# Patient Record
Sex: FEMALE | Race: OTHER | URBAN - METROPOLITAN AREA
[De-identification: names, ages, dates, MRNs, and addresses within clinical notes are randomized per-mention and may not be internally consistent; named-entity substitution may affect disease eponyms.]

---

## 2017-01-25 PROBLEM — Z00.00 ENCOUNTER FOR PREVENTIVE HEALTH EXAMINATION: Status: ACTIVE | Noted: 2017-01-25

## 2017-08-17 ENCOUNTER — OUTPATIENT (OUTPATIENT)
Dept: OUTPATIENT SERVICES | Facility: HOSPITAL | Age: 47
LOS: 1 days | End: 2017-08-17
Payer: COMMERCIAL

## 2017-08-17 VITALS
DIASTOLIC BLOOD PRESSURE: 80 MMHG | WEIGHT: 161.82 LBS | HEART RATE: 76 BPM | RESPIRATION RATE: 20 BRPM | HEIGHT: 62 IN | TEMPERATURE: 98 F | SYSTOLIC BLOOD PRESSURE: 120 MMHG

## 2017-08-17 DIAGNOSIS — N87.0 MILD CERVICAL DYSPLASIA: ICD-10-CM

## 2017-08-17 DIAGNOSIS — Z01.818 ENCOUNTER FOR OTHER PREPROCEDURAL EXAMINATION: ICD-10-CM

## 2017-08-17 DIAGNOSIS — Z98.891 HISTORY OF UTERINE SCAR FROM PREVIOUS SURGERY: Chronic | ICD-10-CM

## 2017-08-17 LAB
ANION GAP SERPL CALC-SCNC: 15 MMOL/L — SIGNIFICANT CHANGE UP (ref 5–17)
BASOPHILS # BLD AUTO: 0 K/UL — SIGNIFICANT CHANGE UP (ref 0–0.2)
BASOPHILS NFR BLD AUTO: 0.4 % — SIGNIFICANT CHANGE UP (ref 0–2)
BUN SERPL-MCNC: 12 MG/DL — SIGNIFICANT CHANGE UP (ref 8–20)
CALCIUM SERPL-MCNC: 9.5 MG/DL — SIGNIFICANT CHANGE UP (ref 8.6–10.2)
CHLORIDE SERPL-SCNC: 104 MMOL/L — SIGNIFICANT CHANGE UP (ref 98–107)
CO2 SERPL-SCNC: 24 MMOL/L — SIGNIFICANT CHANGE UP (ref 22–29)
CREAT SERPL-MCNC: 0.61 MG/DL — SIGNIFICANT CHANGE UP (ref 0.5–1.3)
EOSINOPHIL # BLD AUTO: 0 K/UL — SIGNIFICANT CHANGE UP (ref 0–0.5)
EOSINOPHIL NFR BLD AUTO: 1.1 % — SIGNIFICANT CHANGE UP (ref 0–6)
GLUCOSE SERPL-MCNC: 103 MG/DL — SIGNIFICANT CHANGE UP (ref 70–115)
HCT VFR BLD CALC: 45 % — SIGNIFICANT CHANGE UP (ref 37–47)
HGB BLD-MCNC: 14.8 G/DL — SIGNIFICANT CHANGE UP (ref 12–16)
LYMPHOCYTES # BLD AUTO: 1.6 K/UL — SIGNIFICANT CHANGE UP (ref 1–4.8)
LYMPHOCYTES # BLD AUTO: 35.5 % — SIGNIFICANT CHANGE UP (ref 20–55)
MCHC RBC-ENTMCNC: 25.6 PG — LOW (ref 27–31)
MCHC RBC-ENTMCNC: 32.9 G/DL — SIGNIFICANT CHANGE UP (ref 32–36)
MCV RBC AUTO: 77.7 FL — LOW (ref 81–99)
MONOCYTES # BLD AUTO: 0.4 K/UL — SIGNIFICANT CHANGE UP (ref 0–0.8)
MONOCYTES NFR BLD AUTO: 8 % — SIGNIFICANT CHANGE UP (ref 3–10)
NEUTROPHILS # BLD AUTO: 2.5 K/UL — SIGNIFICANT CHANGE UP (ref 1.8–8)
NEUTROPHILS NFR BLD AUTO: 54.6 % — SIGNIFICANT CHANGE UP (ref 37–73)
PLATELET # BLD AUTO: 244 K/UL — SIGNIFICANT CHANGE UP (ref 150–400)
POTASSIUM SERPL-MCNC: 4.2 MMOL/L — SIGNIFICANT CHANGE UP (ref 3.5–5.3)
POTASSIUM SERPL-SCNC: 4.2 MMOL/L — SIGNIFICANT CHANGE UP (ref 3.5–5.3)
RBC # BLD: 5.79 M/UL — HIGH (ref 4.4–5.2)
RBC # FLD: 14.9 % — SIGNIFICANT CHANGE UP (ref 11–15.6)
SODIUM SERPL-SCNC: 143 MMOL/L — SIGNIFICANT CHANGE UP (ref 135–145)
WBC # BLD: 4.6 K/UL — LOW (ref 4.8–10.8)
WBC # FLD AUTO: 4.6 K/UL — LOW (ref 4.8–10.8)

## 2017-08-17 PROCEDURE — 85027 COMPLETE CBC AUTOMATED: CPT

## 2017-08-17 PROCEDURE — 80048 BASIC METABOLIC PNL TOTAL CA: CPT

## 2017-08-17 PROCEDURE — 36415 COLL VENOUS BLD VENIPUNCTURE: CPT

## 2017-08-17 PROCEDURE — G0463: CPT

## 2017-08-17 NOTE — ASU PATIENT PROFILE, ADULT - LEARNING ASSESSMENT (PATIENT) ADDITIONAL COMMENTS
Instructed pt verbally in Guyanese via telephone  and in writing Gu[place]ese on pre-op instructions, tips for safer surgery, pain management scale and verbalized understanding of all.

## 2017-08-17 NOTE — H&P PST ADULT - HISTORY OF PRESENT ILLNESS
47F  with abnormal pap smear with colposcopy in office showing abnormal cells, now scheduled for LEEP.

## 2017-08-17 NOTE — H&P PST ADULT - NSANTHOSAYNRD_GEN_A_CORE
No. RUTH screening performed.  STOP BANG Legend: 0-2 = LOW Risk; 3-4 = INTERMEDIATE Risk; 5-8 = HIGH Risk

## 2017-08-17 NOTE — H&P PST ADULT - FAMILY HISTORY
Mother  Still living? Yes, Estimated age: Age Unknown  Family history of diabetes mellitus, Age at diagnosis: Age Unknown     Sibling  Still living? Yes, Estimated age: Age Unknown  Family history of diabetes mellitus, Age at diagnosis: Age Unknown

## 2017-08-17 NOTE — H&P PST ADULT - ATTENDING COMMENTS
48yo female admitted at this time for evaluation of her abnormal pap smear-pt underwent colpo and cervical biopsy however the cervix was stenotic and the colpo unsatisfactory and the ecc+ Pt now admitted for leep of the cervix at this time. Risks and benefits were reviewed at length some of which include bleeding ,infection, anesthesia as well as the risk of premature labor and delivery-pt understands and accepts these risks.

## 2017-08-23 ENCOUNTER — OUTPATIENT (OUTPATIENT)
Dept: OUTPATIENT SERVICES | Facility: HOSPITAL | Age: 47
LOS: 1 days | End: 2017-08-23
Payer: COMMERCIAL

## 2017-08-23 ENCOUNTER — RESULT REVIEW (OUTPATIENT)
Age: 47
End: 2017-08-23

## 2017-08-23 VITALS
OXYGEN SATURATION: 99 % | RESPIRATION RATE: 16 BRPM | SYSTOLIC BLOOD PRESSURE: 112 MMHG | HEART RATE: 70 BPM | DIASTOLIC BLOOD PRESSURE: 60 MMHG

## 2017-08-23 VITALS
HEART RATE: 77 BPM | TEMPERATURE: 98 F | SYSTOLIC BLOOD PRESSURE: 115 MMHG | HEIGHT: 62 IN | RESPIRATION RATE: 16 BRPM | OXYGEN SATURATION: 99 % | DIASTOLIC BLOOD PRESSURE: 74 MMHG | WEIGHT: 161.82 LBS

## 2017-08-23 DIAGNOSIS — Z01.818 ENCOUNTER FOR OTHER PREPROCEDURAL EXAMINATION: ICD-10-CM

## 2017-08-23 DIAGNOSIS — Z98.891 HISTORY OF UTERINE SCAR FROM PREVIOUS SURGERY: Chronic | ICD-10-CM

## 2017-08-23 DIAGNOSIS — N87.0 MILD CERVICAL DYSPLASIA: ICD-10-CM

## 2017-08-23 PROCEDURE — 88305 TISSUE EXAM BY PATHOLOGIST: CPT | Mod: 26

## 2017-08-23 PROCEDURE — 57522 CONIZATION OF CERVIX: CPT

## 2017-08-23 PROCEDURE — 88305 TISSUE EXAM BY PATHOLOGIST: CPT

## 2017-08-23 PROCEDURE — 88307 TISSUE EXAM BY PATHOLOGIST: CPT | Mod: 26

## 2017-08-23 PROCEDURE — 88307 TISSUE EXAM BY PATHOLOGIST: CPT

## 2017-08-23 PROCEDURE — T1013: CPT

## 2017-08-23 RX ORDER — IBUPROFEN 200 MG
1 TABLET ORAL
Qty: 21 | Refills: 1 | OUTPATIENT
Start: 2017-08-23 | End: 2017-09-05

## 2017-08-23 RX ORDER — SODIUM CHLORIDE 9 MG/ML
1000 INJECTION, SOLUTION INTRAVENOUS
Qty: 0 | Refills: 0 | Status: DISCONTINUED | OUTPATIENT
Start: 2017-08-23 | End: 2017-08-23

## 2017-08-23 RX ORDER — HYDROMORPHONE HYDROCHLORIDE 2 MG/ML
0.5 INJECTION INTRAMUSCULAR; INTRAVENOUS; SUBCUTANEOUS
Qty: 0 | Refills: 0 | Status: DISCONTINUED | OUTPATIENT
Start: 2017-08-23 | End: 2017-08-23

## 2017-08-23 RX ORDER — ONDANSETRON 8 MG/1
4 TABLET, FILM COATED ORAL ONCE
Qty: 0 | Refills: 0 | Status: DISCONTINUED | OUTPATIENT
Start: 2017-08-23 | End: 2017-08-23

## 2017-08-24 ENCOUNTER — TRANSCRIPTION ENCOUNTER (OUTPATIENT)
Age: 47
End: 2017-08-24

## 2017-08-25 LAB — SURGICAL PATHOLOGY FINAL REPORT - CH: SIGNIFICANT CHANGE UP

## 2018-06-26 ENCOUNTER — OUTPATIENT (OUTPATIENT)
Dept: OUTPATIENT SERVICES | Facility: HOSPITAL | Age: 48
LOS: 1 days | End: 2018-06-26
Payer: COMMERCIAL

## 2018-06-26 VITALS
HEART RATE: 78 BPM | TEMPERATURE: 98 F | SYSTOLIC BLOOD PRESSURE: 119 MMHG | WEIGHT: 168.65 LBS | HEIGHT: 61 IN | RESPIRATION RATE: 16 BRPM | DIASTOLIC BLOOD PRESSURE: 78 MMHG

## 2018-06-26 DIAGNOSIS — N60.09 SOLITARY CYST OF UNSPECIFIED BREAST: Chronic | ICD-10-CM

## 2018-06-26 DIAGNOSIS — N87.0 MILD CERVICAL DYSPLASIA: ICD-10-CM

## 2018-06-26 DIAGNOSIS — Z01.818 ENCOUNTER FOR OTHER PREPROCEDURAL EXAMINATION: ICD-10-CM

## 2018-06-26 DIAGNOSIS — Z98.890 OTHER SPECIFIED POSTPROCEDURAL STATES: Chronic | ICD-10-CM

## 2018-06-26 DIAGNOSIS — Z98.891 HISTORY OF UTERINE SCAR FROM PREVIOUS SURGERY: Chronic | ICD-10-CM

## 2018-06-26 DIAGNOSIS — Z29.9 ENCOUNTER FOR PROPHYLACTIC MEASURES, UNSPECIFIED: ICD-10-CM

## 2018-06-26 LAB
ALBUMIN SERPL ELPH-MCNC: 4.7 G/DL — SIGNIFICANT CHANGE UP (ref 3.3–5.2)
ALP SERPL-CCNC: 108 U/L — SIGNIFICANT CHANGE UP (ref 40–120)
ALT FLD-CCNC: 29 U/L — SIGNIFICANT CHANGE UP
ANION GAP SERPL CALC-SCNC: 15 MMOL/L — SIGNIFICANT CHANGE UP (ref 5–17)
APTT BLD: 30.5 SEC — SIGNIFICANT CHANGE UP (ref 27.5–37.4)
AST SERPL-CCNC: 22 U/L — SIGNIFICANT CHANGE UP
BILIRUB SERPL-MCNC: <0.2 MG/DL — LOW (ref 0.4–2)
BLD GP AB SCN SERPL QL: SIGNIFICANT CHANGE UP
BUN SERPL-MCNC: 12 MG/DL — SIGNIFICANT CHANGE UP (ref 8–20)
CALCIUM SERPL-MCNC: 9.7 MG/DL — SIGNIFICANT CHANGE UP (ref 8.6–10.2)
CHLORIDE SERPL-SCNC: 101 MMOL/L — SIGNIFICANT CHANGE UP (ref 98–107)
CO2 SERPL-SCNC: 25 MMOL/L — SIGNIFICANT CHANGE UP (ref 22–29)
CREAT SERPL-MCNC: 0.59 MG/DL — SIGNIFICANT CHANGE UP (ref 0.5–1.3)
GLUCOSE SERPL-MCNC: 107 MG/DL — SIGNIFICANT CHANGE UP (ref 70–115)
HCT VFR BLD CALC: 44.6 % — SIGNIFICANT CHANGE UP (ref 37–47)
HGB BLD-MCNC: 14.3 G/DL — SIGNIFICANT CHANGE UP (ref 12–16)
INR BLD: 0.99 RATIO — SIGNIFICANT CHANGE UP (ref 0.88–1.16)
MCHC RBC-ENTMCNC: 25.1 PG — LOW (ref 27–31)
MCHC RBC-ENTMCNC: 32.1 G/DL — SIGNIFICANT CHANGE UP (ref 32–36)
MCV RBC AUTO: 78.4 FL — LOW (ref 81–99)
PLATELET # BLD AUTO: 240 K/UL — SIGNIFICANT CHANGE UP (ref 150–400)
POTASSIUM SERPL-MCNC: 3.9 MMOL/L — SIGNIFICANT CHANGE UP (ref 3.5–5.3)
POTASSIUM SERPL-SCNC: 3.9 MMOL/L — SIGNIFICANT CHANGE UP (ref 3.5–5.3)
PROT SERPL-MCNC: 7.9 G/DL — SIGNIFICANT CHANGE UP (ref 6.6–8.7)
PROTHROM AB SERPL-ACNC: 10.9 SEC — SIGNIFICANT CHANGE UP (ref 9.8–12.7)
RBC # BLD: 5.69 M/UL — HIGH (ref 4.4–5.2)
RBC # FLD: 14.9 % — SIGNIFICANT CHANGE UP (ref 11–15.6)
SODIUM SERPL-SCNC: 141 MMOL/L — SIGNIFICANT CHANGE UP (ref 135–145)
TYPE + AB SCN PNL BLD: SIGNIFICANT CHANGE UP
WBC # BLD: 5.8 K/UL — SIGNIFICANT CHANGE UP (ref 4.8–10.8)
WBC # FLD AUTO: 5.8 K/UL — SIGNIFICANT CHANGE UP (ref 4.8–10.8)

## 2018-06-26 PROCEDURE — 93010 ELECTROCARDIOGRAM REPORT: CPT

## 2018-06-26 PROCEDURE — G0463: CPT

## 2018-06-26 PROCEDURE — 93005 ELECTROCARDIOGRAM TRACING: CPT

## 2018-06-26 PROCEDURE — 86850 RBC ANTIBODY SCREEN: CPT

## 2018-06-26 PROCEDURE — T1013: CPT

## 2018-06-26 PROCEDURE — 86901 BLOOD TYPING SEROLOGIC RH(D): CPT

## 2018-06-26 PROCEDURE — 80053 COMPREHEN METABOLIC PANEL: CPT

## 2018-06-26 PROCEDURE — 85610 PROTHROMBIN TIME: CPT

## 2018-06-26 PROCEDURE — 85730 THROMBOPLASTIN TIME PARTIAL: CPT

## 2018-06-26 PROCEDURE — 86900 BLOOD TYPING SEROLOGIC ABO: CPT

## 2018-06-26 PROCEDURE — 85027 COMPLETE CBC AUTOMATED: CPT

## 2018-06-26 PROCEDURE — 36415 COLL VENOUS BLD VENIPUNCTURE: CPT

## 2018-06-26 RX ORDER — SODIUM CHLORIDE 9 MG/ML
3 INJECTION INTRAMUSCULAR; INTRAVENOUS; SUBCUTANEOUS ONCE
Qty: 0 | Refills: 0 | Status: DISCONTINUED | OUTPATIENT
Start: 2018-07-10 | End: 2018-07-10

## 2018-06-26 NOTE — H&P PST ADULT - FAMILY HISTORY
Mother  Still living? Yes, Estimated age: 71-80  Family history of diabetes mellitus, Age at diagnosis: Age Unknown  Family history of hypertension in mother, Age at diagnosis: Age Unknown     Sibling  Still living? Yes, Estimated age: Age Unknown  Family history of diabetes mellitus, Age at diagnosis: Age Unknown     Father  Still living? No  Family history of COPD (chronic obstructive pulmonary disease), Age at diagnosis: Age Unknown

## 2018-06-26 NOTE — H&P PST ADULT - NEGATIVE GASTROINTESTINAL SYMPTOMS
no jaundice/no steatorrhea/no abdominal pain/no hematochezia/no hiccoughs/no diarrhea/no change in bowel habits/no melena/no flatulence/no vomiting/no constipation/no nausea

## 2018-06-26 NOTE — H&P PST ADULT - GASTROINTESTINAL DETAILS
no rebound tenderness/no distention/no guarding/nontender/normal/no organomegaly/soft/no masses palpable/bowel sounds normal/no rigidity/no bruit

## 2018-06-26 NOTE — H&P PST ADULT - NEGATIVE GENERAL GENITOURINARY SYMPTOMS
no renal colic/no gas in urine/no dysuria/no incontinence/no flank pain L/no flank pain R/no nocturia/no urine discoloration/normal libido/no urinary hesitancy/normal urinary frequency/no bladder infections/no hematuria

## 2018-06-26 NOTE — H&P PST ADULT - NEGATIVE ENMT SYMPTOMS
no sinus symptoms/no gum bleeding/no vertigo/no hearing difficulty/no ear pain/no throat pain/no dysphagia/no nasal congestion/no recurrent cold sores/no nasal obstruction/no nasal discharge/no post-nasal discharge/no nose bleeds/no dry mouth/no tinnitus/no abnormal taste sensation

## 2018-06-26 NOTE — H&P PST ADULT - NEGATIVE NEUROLOGICAL SYMPTOMS
no transient paralysis/no syncope/no vertigo/no generalized seizures/no headache/no focal seizures/no loss of sensation/no loss of consciousness/no paresthesias/no hemiparesis/no facial palsy/no weakness/no confusion/no difficulty walking/no tremors

## 2018-06-26 NOTE — H&P PST ADULT - NEGATIVE OPHTHALMOLOGIC SYMPTOMS
no discharge L/no irritation L/no pain R/no loss of vision L/no scleral injection L/no loss of vision R/no lacrimation L/no lacrimation R/no blurred vision L/no irritation R/no diplopia/no photophobia/no blurred vision R/no discharge R/no pain L

## 2018-06-26 NOTE — H&P PST ADULT - ASSESSMENT
cervical dysplasia  CAPRINI SCORE    AGE RELATED RISK FACTORS                                                       MOBILITY RELATED FACTORS  [ x] Age 41-60 years                                            (1 Point)                  [ ] Bed rest                                                        (1 Point)  [ ] Age: 61-74 years                                           (2 Points)                [ ] Plaster cast                                                   (2 Points)  [ ] Age= 75 years                                              (3 Points)                 [ ] Bed bound for more than 72 hours                   (2 Points)    DISEASE RELATED RISK FACTORS                                               GENDER SPECIFIC FACTORS  [ ] Edema in the lower extremities                       (1 Point)                  [ ] Pregnancy                                                     (1 Point)  [ ] Varicose veins                                               (1 Point)                  [ ] Post-partum < 6 weeks                                   (1 Point)             [x ] BMI > 25 Kg/m2                                            (1 Point)                  [ ] Hormonal therapy  or oral contraception            (1 Point)                 [ ] Sepsis (in the previous month)                        (1 Point)                  [ ] History of pregnancy complications  [ ] Pneumonia or serious lung disease                                               [ ] Unexplained or recurrent                       (1 Point)           (in the previous month)                               (1 Point)  [ ] Abnormal pulmonary function test                     (1 Point)                 SURGERY RELATED RISK FACTORS  [ ] Acute myocardial infarction                              (1 Point)                 [ ]  Section                                            (1 Point)  [ ] Congestive heart failure (in the previous month)  (1 Point)                 [ ] Minor surgery                                                 (1 Point)   [ ] Inflammatory bowel disease                             (1 Point)                 [ ] Arthroscopic surgery                                        (2 Points)  [ ] Central venous access                                    (2 Points)                [x ] General surgery lasting more than 45 minutes   (2 Points)       [ ] Stroke (in the previous month)                          (5 Points)               [ ] Elective arthroplasty                                        (5 Points)                                                                                                                                               HEMATOLOGY RELATED FACTORS                                                 TRAUMA RELATED RISK FACTORS  [ ] Prior episodes of VTE                                     (3 Points)                 [ ] Fracture of the hip, pelvis, or leg                       (5 Points)  [ ] Positive family history for VTE                         (3 Points)                 [ ] Acute spinal cord injury (in the previous month)  (5 Points)  [ ] Prothrombin 93702 A                                      (3 Points)                 [ ] Paralysis  (less than 1 month)                          (5 Points)  [ ] Factor V Leiden                                             (3 Points)                 [ ] Multiple Trauma within 1 month                         (5 Points)  [ ] Lupus anticoagulants                                     (3 Points)                                                           [ ] Anticardiolipin antibodies                                (3 Points)                                                       [ ] High homocysteine in the blood                      (3 Points)                                             [ ] Other congenital or acquired thrombophilia       (3 Points)                                                [ ] Heparin induced thrombocytopenia                  (3 Points)                                          Total Score [        4  ]

## 2018-06-26 NOTE — H&P PST ADULT - NEGATIVE CARDIOVASCULAR SYMPTOMS
no claudication/no orthopnea/no chest pain/no dyspnea on exertion/no paroxysmal nocturnal dyspnea/no peripheral edema/no palpitations

## 2018-06-26 NOTE — H&P PST ADULT - RS GEN PE MLT RESP DETAILS PC
respirations non-labored/clear to auscultation bilaterally/normal/no rhonchi/no rales/no wheezes/no subcutaneous emphysema/breath sounds equal/good air movement/airway patent/no chest wall tenderness/no intercostal retractions

## 2018-06-26 NOTE — H&P PST ADULT - NEGATIVE MUSCULOSKELETAL SYMPTOMS
no stiffness/no back pain/no leg pain L/no arm pain L/no arm pain R/no joint swelling/no arthralgia/no arthritis/no neck pain/no muscle cramps/no muscle weakness/no myalgia

## 2018-06-26 NOTE — H&P PST ADULT - NEGATIVE SKIN SYMPTOMS
no dryness/no brittle nails/no hair loss/no tumor/no rash/no itching/no change in size/color of mole/no pitted nails

## 2018-06-26 NOTE — H&P PST ADULT - HISTORY OF PRESENT ILLNESS
47 y/o female went for PAP smear which showed abnormal cell patient now presents for cold knife  c 49 y/o female went for PAP smear which showed abnormal cell patient now presents for cold knife conization of cervix endocervical curettage

## 2018-06-26 NOTE — H&P PST ADULT - NEGATIVE FEMALE-SPECIFIC SYMPTOMS
no amenorrhea/no spotting/no pelvic pain/not applicable/no dysmenorrhea/no menorrhagia/no abnormal vaginal bleeding/no irregular menses/no vaginal discharge

## 2018-06-26 NOTE — H&P PST ADULT - NEUROLOGICAL DETAILS
responds to pain/normal strength/alert and oriented x 3/deep reflexes intact/cranial nerves intact/superficial reflexes intact/responds to verbal commands/no spontaneous movement/sensation intact

## 2018-06-26 NOTE — H&P PST ADULT - NEGATIVE GENERAL SYMPTOMS
no polydipsia/no polyuria/no fatigue/no weight loss/no weight gain/no polyphagia/no sweating/no chills/no anorexia/no malaise/no fever

## 2018-06-26 NOTE — H&P PST ADULT - MUSCULOSKELETAL
details… detailed exam no joint erythema/ROM intact/normal strength/normal/no joint swelling/no calf tenderness/no joint warmth

## 2018-07-10 ENCOUNTER — RESULT REVIEW (OUTPATIENT)
Age: 48
End: 2018-07-10

## 2018-07-10 ENCOUNTER — OUTPATIENT (OUTPATIENT)
Dept: OUTPATIENT SERVICES | Facility: HOSPITAL | Age: 48
LOS: 1 days | End: 2018-07-10
Payer: COMMERCIAL

## 2018-07-10 VITALS
SYSTOLIC BLOOD PRESSURE: 123 MMHG | WEIGHT: 168.65 LBS | TEMPERATURE: 97 F | RESPIRATION RATE: 16 BRPM | HEART RATE: 80 BPM | DIASTOLIC BLOOD PRESSURE: 75 MMHG | OXYGEN SATURATION: 100 % | HEIGHT: 61 IN

## 2018-07-10 VITALS
DIASTOLIC BLOOD PRESSURE: 99 MMHG | TEMPERATURE: 97 F | RESPIRATION RATE: 21 BRPM | HEART RATE: 85 BPM | SYSTOLIC BLOOD PRESSURE: 114 MMHG | OXYGEN SATURATION: 100 %

## 2018-07-10 DIAGNOSIS — N87.0 MILD CERVICAL DYSPLASIA: ICD-10-CM

## 2018-07-10 DIAGNOSIS — Z98.890 OTHER SPECIFIED POSTPROCEDURAL STATES: Chronic | ICD-10-CM

## 2018-07-10 DIAGNOSIS — N60.09 SOLITARY CYST OF UNSPECIFIED BREAST: Chronic | ICD-10-CM

## 2018-07-10 DIAGNOSIS — Z98.891 HISTORY OF UTERINE SCAR FROM PREVIOUS SURGERY: Chronic | ICD-10-CM

## 2018-07-10 PROCEDURE — 88307 TISSUE EXAM BY PATHOLOGIST: CPT | Mod: 26

## 2018-07-10 PROCEDURE — 88305 TISSUE EXAM BY PATHOLOGIST: CPT

## 2018-07-10 PROCEDURE — 88305 TISSUE EXAM BY PATHOLOGIST: CPT | Mod: 26

## 2018-07-10 PROCEDURE — 88307 TISSUE EXAM BY PATHOLOGIST: CPT

## 2018-07-10 PROCEDURE — 57520 CONIZATION OF CERVIX: CPT

## 2018-07-10 RX ORDER — OMEPRAZOLE 10 MG/1
1 CAPSULE, DELAYED RELEASE ORAL
Qty: 0 | Refills: 0 | COMMUNITY

## 2018-07-10 RX ORDER — ONDANSETRON 8 MG/1
4 TABLET, FILM COATED ORAL ONCE
Qty: 0 | Refills: 0 | Status: DISCONTINUED | OUTPATIENT
Start: 2018-07-10 | End: 2018-07-10

## 2018-07-10 RX ORDER — SODIUM CHLORIDE 9 MG/ML
1000 INJECTION, SOLUTION INTRAVENOUS
Qty: 0 | Refills: 0 | Status: DISCONTINUED | OUTPATIENT
Start: 2018-07-10 | End: 2018-07-10

## 2018-07-10 RX ORDER — FENTANYL CITRATE 50 UG/ML
50 INJECTION INTRAVENOUS
Qty: 0 | Refills: 0 | Status: DISCONTINUED | OUTPATIENT
Start: 2018-07-10 | End: 2018-07-10

## 2018-07-10 NOTE — ASU DISCHARGE PLAN (ADULT/PEDIATRIC). - NOTIFY
Bleeding that does not stop/Inability to Tolerate Liquids or Foods/Fever greater than 101/GYN Fever>100.4

## 2018-07-10 NOTE — ASU DISCHARGE PLAN (ADULT/PEDIATRIC). - MEDICATION SUMMARY - MEDICATIONS TO TAKE
I will START or STAY ON the medications listed below when I get home from the hospital:    Naprosyn 500 mg oral tablet  -- 1 tab(s) by mouth 2 times a day   -- Check with your doctor before becoming pregnant.  May cause drowsiness or dizziness.  Obtain medical advice before taking any non-prescription drugs as some may affect the action of this medication.  Take with food or milk.    -- Indication: For pain

## 2020-05-08 NOTE — ASU PREOP CHECKLIST - SELECT TESTS ORDERED
Per AIM case does not meet medical necessity and peer to peer needs to be complete, please call AIM and complete peer to peer and send task directly back to SELMA SONI with authorization number and valid dates, thank you    AIM number= [phone]  Insurance ID number= [id number]   [place]

## 2021-08-23 NOTE — ASU DISCHARGE PLAN (ADULT/PEDIATRIC). - RN NAME (PRINT)_____________________________________________
Left knee trace effusion, midline incision well healed, ROM 0-130 degrees, laxity left midial thrust 12 degrees of valgus, 10 degrees extensor lag, medial lateral instabilty grater then 5  mm and AP translation grater then 10 mm. Calf is soft and non-tender.
Statement Selected

## 2021-08-23 NOTE — H&P PST ADULT - OPHTHALMOLOGIC
What Is The Reason For Today's Visit?: Surveillance against skin cancer recurrences
How Many Skin Cancers Have You Had?: one
negative

## 2023-07-11 ENCOUNTER — OFFICE (OUTPATIENT)
Dept: URBAN - METROPOLITAN AREA CLINIC 94 | Facility: CLINIC | Age: 53
Setting detail: OPHTHALMOLOGY
End: 2023-07-11
Payer: MEDICAID

## 2023-07-11 DIAGNOSIS — H10.13: ICD-10-CM

## 2023-07-11 DIAGNOSIS — H04.123: ICD-10-CM

## 2023-07-11 DIAGNOSIS — H47.233: ICD-10-CM

## 2023-07-11 DIAGNOSIS — H52.4: ICD-10-CM

## 2023-07-11 PROCEDURE — 92015 DETERMINE REFRACTIVE STATE: CPT | Performed by: OPHTHALMOLOGY

## 2023-07-11 PROCEDURE — 92014 COMPRE OPH EXAM EST PT 1/>: CPT | Performed by: OPHTHALMOLOGY

## 2023-07-11 ASSESSMENT — KERATOMETRY
OS_AXISANGLE_DEGREES: 090
METHOD_AUTO_MANUAL: AUTO
OS_K1POWER_DIOPTERS: 44.00
OD_K1POWER_DIOPTERS: 44.00
OD_AXISANGLE_DEGREES: 035
OS_K2POWER_DIOPTERS: 44.00
OD_K2POWER_DIOPTERS: 44.25

## 2023-07-11 ASSESSMENT — REFRACTION_MANIFEST
OD_ADD: +2.00
OD_VA1: 20/20
OS_SPHERE: +0.25
OS_VA2: 20/20
OD_ADD: +0.75
OD_VA2: 20/20
OD_AXIS: 105
OD_ADD: +1.75
OD_CYLINDER: -0.50
OS_ADD: +2.00
OD_VA2: 20/20
OS_VA1: 20/20
OS_AXIS: 085
OS_SPHERE: +0.50
OD_CYLINDER: -0.50
OS_SPHERE: +1.00
OS_VA2: 20/20
OD_VA1: 20/20
OS_AXIS: 090
OS_ADD: +0.75
OS_CYLINDER: -0.25
OS_AXIS: 090
OD_CYLINDER: -0.75
OD_VA2: 20/20
OS_CYLINDER: -0.50
OS_CYLINDER: -0.50
OS_VA2: 20/20
OD_SPHERE: +1.00
OD_SPHERE: +0.50
OU_VA: 20/15
OD_SPHERE: +0.50
OD_AXIS: 100
OD_VA1: 20/20
OS_ADD: +1.75
OD_AXIS: 095

## 2023-07-11 ASSESSMENT — AXIALLENGTH_DERIVED
OD_AL: 22.9886
OS_AL: 23.3142
OD_AL: 23.2694
OS_AL: 22.9859
OD_AL: 23.1281
OD_AL: 23.2694
OS_AL: 23.3618
OS_AL: 23.1255

## 2023-07-11 ASSESSMENT — CONFRONTATIONAL VISUAL FIELD TEST (CVF)
OS_FINDINGS: FULL
OD_FINDINGS: FULL

## 2023-07-11 ASSESSMENT — REFRACTION_CURRENTRX
OS_OVR_VA: 20/
OD_OVR_VA: 20/
OD_SPHERE: +1.50
OS_VPRISM_DIRECTION: SV
OS_SPHERE: +1.50
OS_CYLINDER: -0.25
OD_AXIS: 098
OD_CYLINDER: -0.50
OD_VPRISM_DIRECTION: SV
OS_AXIS: 090

## 2023-07-11 ASSESSMENT — SPHEQUIV_DERIVED
OD_SPHEQUIV: 0.625
OD_SPHEQUIV: 0.25
OD_SPHEQUIV: 1
OS_SPHEQUIV: 0.75
OD_SPHEQUIV: 0.25
OS_SPHEQUIV: 0.125
OS_SPHEQUIV: 0.25
OS_SPHEQUIV: 1.125

## 2023-07-11 ASSESSMENT — REFRACTION_AUTOREFRACTION
OD_SPHERE: +1.50
OS_AXIS: 093
OS_SPHERE: +1.50
OD_CYLINDER: -1.00
OD_AXIS: 101
OS_CYLINDER: -0.75

## 2023-07-11 ASSESSMENT — PACHYMETRY
OS_CT_UM: 576
OS_CT_CORRECTION: -2
OD_CT_CORRECTION: -6
OD_CT_UM: 622

## 2023-07-11 ASSESSMENT — TONOMETRY
OS_IOP_MMHG: 20
OD_IOP_MMHG: 17

## 2023-07-11 ASSESSMENT — VISUAL ACUITY
OD_BCVA: 20/25+
OS_BCVA: 20/25-

## 2024-03-28 ENCOUNTER — OFFICE (OUTPATIENT)
Dept: URBAN - METROPOLITAN AREA CLINIC 116 | Facility: CLINIC | Age: 54
Setting detail: OPHTHALMOLOGY
End: 2024-03-28
Payer: COMMERCIAL

## 2024-03-28 ENCOUNTER — RX ONLY (RX ONLY)
Age: 54
End: 2024-03-28

## 2024-03-28 DIAGNOSIS — H16.222: ICD-10-CM

## 2024-03-28 DIAGNOSIS — H01.001: ICD-10-CM

## 2024-03-28 DIAGNOSIS — H01.004: ICD-10-CM

## 2024-03-28 PROCEDURE — 92014 COMPRE OPH EXAM EST PT 1/>: CPT | Performed by: OPTOMETRIST

## 2024-03-28 ASSESSMENT — REFRACTION_MANIFEST
OD_CYLINDER: -0.75
OD_VA1: 20/20
OD_SPHERE: +1.00
OD_AXIS: 105
OD_SPHERE: +0.50
OD_ADD: +2.00
OD_SPHERE: +0.50
OS_VA2: 20/20
OS_AXIS: 090
OS_AXIS: 085
OD_VA1: 20/20
OD_ADD: +1.75
OS_AXIS: 090
OD_CYLINDER: -0.50
OS_VA1: 20/20
OS_VA1: 20/20
OS_SPHERE: +0.25
OS_SPHERE: +0.50
OD_ADD: +0.75
OS_CYLINDER: -0.50
OD_VA1: 20/25
OS_VA2: 20/20
OS_ADD: +1.75
OS_SPHERE: +1.00
OU_VA: 20/15
OS_VA2: 20/20
OS_ADD: +2.00
OD_VA2: 20/20
OD_VA2: 20/20
OS_CYLINDER: -0.25
OS_VA1: 20/20
OS_CYLINDER: -0.50
OD_VA2: 20/20
OD_AXIS: 100
OD_CYLINDER: -0.50
OD_AXIS: 095
OS_ADD: +0.75
OD_VA1: 20/20

## 2024-03-28 ASSESSMENT — REFRACTION_CURRENTRX
OD_AXIS: 098
OD_OVR_VA: 20/
OD_VPRISM_DIRECTION: SV
OS_VPRISM_DIRECTION: SV
OD_SPHERE: +1.50
OS_CYLINDER: -0.25
OS_OVR_VA: 20/
OS_AXIS: 090
OD_CYLINDER: -0.50
OS_SPHERE: +1.50

## 2024-03-28 ASSESSMENT — LID EXAM ASSESSMENTS
OS_BLEPHARITIS: LUL 4+
OD_BLEPHARITIS: RUL 3+

## 2024-03-28 ASSESSMENT — SPHEQUIV_DERIVED
OD_SPHEQUIV: 0.25
OS_SPHEQUIV: 0.25
OD_SPHEQUIV: 0.25
OD_SPHEQUIV: 0.625
OS_SPHEQUIV: 0.75
OS_SPHEQUIV: 0.125

## 2024-04-12 ENCOUNTER — OFFICE (OUTPATIENT)
Dept: URBAN - METROPOLITAN AREA CLINIC 116 | Facility: CLINIC | Age: 54
Setting detail: OPHTHALMOLOGY
End: 2024-04-12
Payer: COMMERCIAL

## 2024-04-12 DIAGNOSIS — H01.001: ICD-10-CM

## 2024-04-12 DIAGNOSIS — H01.004: ICD-10-CM

## 2024-04-12 DIAGNOSIS — H16.222: ICD-10-CM

## 2024-04-12 PROCEDURE — 99213 OFFICE O/P EST LOW 20 MIN: CPT | Performed by: OPTOMETRIST

## 2024-04-12 ASSESSMENT — LID EXAM ASSESSMENTS
OD_BLEPHARITIS: RUL 3+
OS_BLEPHARITIS: LUL 4+

## 2024-04-15 NOTE — ASU PATIENT PROFILE, ADULT - IS PATIENT PREGNANT?
From: Ese Rodriguez  To: Lila Ladd  Sent: 4/14/2024 2:46 PM CDT  Subject: Medication transfer    Good Sunday Afternoon     Just wanted to update on pharmacy transfer. Still at [facility]. But in [place]. [address]    See ya Wednesday    no